# Patient Record
Sex: MALE | Race: WHITE | NOT HISPANIC OR LATINO | Employment: STUDENT | URBAN - METROPOLITAN AREA
[De-identification: names, ages, dates, MRNs, and addresses within clinical notes are randomized per-mention and may not be internally consistent; named-entity substitution may affect disease eponyms.]

---
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
## 2025-01-31 ENCOUNTER — OFFICE VISIT (OUTPATIENT)
Dept: FAMILY MEDICINE CLINIC | Facility: CLINIC | Age: 20
End: 2025-01-31
Payer: COMMERCIAL

## 2025-01-31 VITALS
WEIGHT: 304.6 LBS | HEIGHT: 69 IN | SYSTOLIC BLOOD PRESSURE: 124 MMHG | DIASTOLIC BLOOD PRESSURE: 84 MMHG | RESPIRATION RATE: 18 BRPM | OXYGEN SATURATION: 97 % | HEART RATE: 80 BPM | BODY MASS INDEX: 45.11 KG/M2

## 2025-01-31 DIAGNOSIS — K76.0 FATTY LIVER: ICD-10-CM

## 2025-01-31 DIAGNOSIS — K21.9 GASTROESOPHAGEAL REFLUX DISEASE WITHOUT ESOPHAGITIS: ICD-10-CM

## 2025-01-31 DIAGNOSIS — Z00.00 PREVENTATIVE HEALTH CARE: Primary | ICD-10-CM

## 2025-01-31 DIAGNOSIS — E66.09 OBESITY DUE TO EXCESS CALORIES WITHOUT SERIOUS COMORBIDITY, UNSPECIFIED CLASS: ICD-10-CM

## 2025-01-31 DIAGNOSIS — F32.A ANXIETY AND DEPRESSION: ICD-10-CM

## 2025-01-31 DIAGNOSIS — F41.9 ANXIETY AND DEPRESSION: ICD-10-CM

## 2025-01-31 PROCEDURE — 99395 PREV VISIT EST AGE 18-39: CPT | Performed by: INTERNAL MEDICINE

## 2025-01-31 RX ORDER — OMEPRAZOLE 40 MG/1
40 CAPSULE, DELAYED RELEASE ORAL DAILY
Qty: 30 CAPSULE | Refills: 1 | Status: SHIPPED | OUTPATIENT
Start: 2025-01-31

## 2025-01-31 NOTE — PROGRESS NOTES
"Chief Complaint  Annual Exam    HPI:    Abelardo Mcfadden presents to [facility]    History of anxiety and depression  Initially diagnosed at young age and has been on multiple medications including Zoloft.  Previously discontinued SSRI around 2021.  Mood overall recently good.  Appetite, sleep, energy overall good.  Denies suicidal/homicidal ideations.     GERD  Previously prescribed omeprazole 40 mg daily. He is not currently taking but has noticed that he has been having some heart burn and some throat clearing. Denies dysphagia or odynophagia.     Previously on Vyvanse and Adderall to curb eating.  No longer taking medications.    Preventative: Most recent preventative care visit 1/18/2024 with completed labs.    Social: Enjoys video games, planning on trying to go back to [employer].     Diet and Exercise: Overall could be better    Alcohol, Tobacco, and Recreational Drug use: None    Cancer screenings:  PSA: No known family history of prostate cancer  Colonoscopy: Paternal great grandfather with history of colon cancer in 70's.     Immunizations: Due for flu and COVID booster    [facility] Directive: Not on file      Review of Systems:  ROS negative unless otherwise noted in HPI above.    Past Medical History:   Diagnosis Date    Anxiety     Depression        No current outpatient medications on file.    Social History     Socioeconomic History    Marital status: Single   Tobacco Use    Smoking status: Never    Smokeless tobacco: Never   Vaping Use    Vaping status: Never Used   Substance and Sexual Activity    Drug use: Never        Objective   Vital Signs:  /84   Pulse 80   Resp 18   Ht 175.3 cm (69\")   Wt (!) 138 kg (304 lb 9.6 oz)   SpO2 97%   BMI 44.98 kg/m²   Estimated body mass index is 44.98 kg/m² as calculated from the following:    Height as of this encounter: 175.3 cm (69\").    Weight as of this encounter: 138 kg (304 lb 9.6 oz).    Physical " Exam:  General: Well-appearing patient, no apparent distress  HEENT: No posterior pharynx erythema, no tonsillar erythema or exudates, normal external auditory canals, TM normal without bulging or erythema  Cardiac: Regular rate and rhythm, normal S1/S2, no murmur, rubs or gallops, no lower extremity edema  Lungs: Clear to auscultation bilaterally, no crackles or wheezes  Abdomen: Soft, non-tender, no guarding or rebound tenderness, no hepatosplenomegaly  Skin: No significant rashes or lesions  MSK: Grossly normal tone and strength  Neuro: Alert and oriented x3, CN II-XII grossly intact  Psych: Appropriate mood and affect    Assessment and Plan:    (Z00.00) Preventative health care  Patient is a 19 year old male who is overall doing well. Reviewed social and family history. Encouraged increased healthy diet and exercise and discussed importance to overall health. Up to date with age and gender appropriate cancer screenings.  Discussed indicated vaccines based on age and comorbidities. No skin, mood concerns.  Plan:  - Encourage healthy diet and exercise  - Up date vaccines, if necessary; patient planning on getting flu and COVID at local pharmacy  - Screening labs up-to-date    (K21.9) Gastroesophageal reflux disease without esophagitis  Assessment: Patient with recent reflux symptoms since not being on medication.  Previously on omeprazole 40 mg daily and tolerated well.  Denies constitutional or red flag symptoms.  No current signs or symptoms to warrant endoscopy.  Plan:  - Start omeprazole 40 mg daily  - Avoid potential triggers  - Endoscopy  - Follow up if not improving     (F41.9,  F32.A) Anxiety and depression  Assessment: Mood overall doing well off medications, including previously prescribed sertraline.  Plan:  - Hold on medications for now  - Encouraged healthy diet and exercise  - Encourage patient to do things that he enjoys    (K76.0) Fatty liver  Assessment: Previously noted on prior liver ultrasound.   Discussed the importance of healthy diet and exercise.  Plan:   - Encouraged healthy diet and exercise  - Intermittently monitor LFTs    Pediatric BMI = >99 %ile (Z= 2.91) based on CDC (Boys, 2-20 Years) BMI-for-age based on BMI available on 1/31/2025.. Class 3 Severe Obesity (BMI >=40). Obesity-related health conditions include the following: hepatic steatosis. Obesity is worsening. BMI is is above average; BMI management plan is completed. We discussed encouraging healthy diet and exercise.      Patient was given instructions and counseling regarding his condition or for health maintenance advice. Please see specific information pulled into the AVS if appropriate.       Dr Hakeem Block   Internal Medicine Physician  [facility]  [address], [address]  [place] [phone]

## 2025-01-31 NOTE — PATIENT INSTRUCTIONS
No labs today    Flu and COVID vaccine at local pharmacy    Medications:  Omeprazole 40 mg daily    Avoid reflux triggers    Encourage healthy diet and exercise    Follow up in one year or sooner if something arises